# Patient Record
Sex: FEMALE | Race: BLACK OR AFRICAN AMERICAN | NOT HISPANIC OR LATINO | Employment: FULL TIME | URBAN - METROPOLITAN AREA
[De-identification: names, ages, dates, MRNs, and addresses within clinical notes are randomized per-mention and may not be internally consistent; named-entity substitution may affect disease eponyms.]

---

## 2024-05-07 DIAGNOSIS — M79.10 MYALGIA: Primary | ICD-10-CM

## 2024-05-15 ENCOUNTER — CLINICAL SUPPORT (OUTPATIENT)
Dept: REHABILITATION | Facility: HOSPITAL | Age: 54
End: 2024-05-15
Payer: OTHER GOVERNMENT

## 2024-05-15 DIAGNOSIS — M54.59 OTHER LOW BACK PAIN: Primary | ICD-10-CM

## 2024-05-15 PROCEDURE — 97813 ACUP 1/> W/ESTIM 1ST 15 MIN: CPT | Mod: PN

## 2024-05-15 PROCEDURE — 97814 ACUP 1/> W/ESTIM EA ADDL 15: CPT | Mod: PN

## 2024-05-15 NOTE — PROGRESS NOTES
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  Medical Diagnosis: Chronic lower back pain with sciatica    Evaluation Date: 5/15/2024    Visit #/Visits authorized: [unfilled] 1/12    Precautions: Standard and Pre Diabetes    Subjective     Chief Concern: Lower back pain for 25 years    Cancer Related Symptoms: None    Medical necessity is demonstrated by the following IMPAIRMENTS: Medical Necessity: Decreased mobility limits day to day activities, social, and emergent situations and Decreased quality of life                Aggravating Factors:  standing and prolonged sitting   Relieving Factors:  Medication    Symptom Description:     Quality:  Throbbing  Severity:  9  Frequency:  continuously    Previous Treatments Tried:  Medication    HEENT:  WNL    Chest:  WNL    Digestion:     Diet: in general, a "healthy" diet     Fluids: coffee 1 /day, is drinking plenty of fluids, none  Taste/Appetite: good   Symptoms: none    Sleep: not good    Energy Levels:  6/10    Psychological Symptoms:  None    Other Symptoms: None    GYN Symptoms: None    Objective     Observation: Looks healthy    Tongue:      Body:  swollen edges   Color:  pink   Coating:  thin,  and white,    Pulse:        wiry       New Findings:  None    Treatment     Treatment Principles:  Move Qi and Blood, stop pain    Acupuncture points used:    Bilateral points:James Steveno Karma Ji +5, Bella on lower back + 5, UB 54, GB 30, Bella on gluteal muscle + 4  Unilateral points:  Left:  Right:  EA: Right: UB 54, GB 30, Bella on gluteal muscle + 2  EA: Left: UB 54, GB 30, Bella on gluteal muscle + 2  Auricular Treatment:  None  Needles In: 32  Needles Out: 32  Needles W/ STIM placed: 3:50 PM  Needles W/ STIM removed: 4:50 PM      Other Traditional Chinese Medicine Modalities - None    Assessment     After treatment, patient felt Patient felt good "     Patient prognosis is Good.     Patient will continue to benefit from acupuncture treatment to address the deficits listed in the problem list box on initial evaluation, provide patient family education and to maximize pt's level of independence in the home and community environment.     Patient's spiritual, cultural and educational needs considered and pt agreeable to plan of care and goals.     Anticipated barriers to treatment: None    Plan     Recommend 1 /week for 12 sessions then re-assess.      Education:  Patient is aware of cumulative benefit of acupuncture

## 2024-05-22 ENCOUNTER — CLINICAL SUPPORT (OUTPATIENT)
Dept: REHABILITATION | Facility: HOSPITAL | Age: 54
End: 2024-05-22
Payer: OTHER GOVERNMENT

## 2024-05-22 DIAGNOSIS — M54.59 OTHER LOW BACK PAIN: Primary | ICD-10-CM

## 2024-05-22 PROCEDURE — 97814 ACUP 1/> W/ESTIM EA ADDL 15: CPT | Mod: PN

## 2024-05-22 PROCEDURE — 97813 ACUP 1/> W/ESTIM 1ST 15 MIN: CPT | Mod: PN

## 2024-05-22 NOTE — PROGRESS NOTES
Acupuncture Treatment Note     Name: Lyudmila Ch [facility] Number: [id number]    Traditional Chinese Medicine Diagnosis: Qi Stagnation and Blood Stasis   Physician: Order, Paper    Date of Service: 5/22/2024     Medical Diagnosis: Chronic lower back pain with sciatica   Visit #/Visits authorized: 2/ 12     Precautions: Standard and Pre Diabetes     Subjective     Chief Complaint:  Lower back pain for 25 years     Cancer Related Symptoms: None    Medical necessity is demonstrated by the following IMPAIRMENTS: Medical Necessity: Decreased mobility limits day to day activities, social, and emergent situations and Decreased quality of life    Response to Previous Treatment:  good    Quality of Symptoms (Better/Worse):  better    Other Condition/Symptoms:  None    Objective      New Findings:  None    Treatment Principles:   Move Qi and Blood, stop pain     Acupuncture points used:    Bilateral points:James Tuo Karma Ji +3, Bella on lower back + 7, UB 54, GB 30, Bella on gluteal muscle + 6  Unilateral points:  Left:  Right:  Auricular Treatment:  None  Needles In: 36  Needles Out: 36  EA: Right: UB 54, GB 30, Bella on gluteal muscle + 2  EA: Left: UB 54, GB 30, Bella on gluteal muscle + 2  Needles W/ STIM placed: 3:45 PM  Needles W/ STIM removed: 4:20PM    Other Traditional Chinese Medicine Modalities:  None    Recommendations:  PT    Education:  Patient is aware of cumulative effect of acupuncture      Assessment      Analysis of Treatment:  Patient felt good    Pt prognosis is Good.     Patient will continue to benefit from acupuncture treatment to address the deficits listed in the problem list box on initial evaluation, provide patient family education and to maximize pt's level of independence in the home and community environment.     Patient's spiritual, cultural and educational needs considered and pt agreeable to plan of care and goals.     Anticipated barriers to treatment: None    Plan     Recommend     1    /week for 12  treatments and re-assess.

## 2024-06-04 ENCOUNTER — CLINICAL SUPPORT (OUTPATIENT)
Dept: REHABILITATION | Facility: HOSPITAL | Age: 54
End: 2024-06-04
Payer: OTHER GOVERNMENT

## 2024-06-04 DIAGNOSIS — M54.59 OTHER LOW BACK PAIN: Primary | ICD-10-CM

## 2024-06-04 PROCEDURE — 97810 ACUP 1/> WO ESTIM 1ST 15 MIN: CPT | Mod: PN

## 2024-06-04 PROCEDURE — 97811 ACUP 1/> W/O ESTIM EA ADD 15: CPT | Mod: PN

## 2024-06-04 NOTE — PROGRESS NOTES
Acupuncture Treatment Note     Name: Lyudmila Ch [facility] Number: [id number]    Traditional Chinese Medicine Diagnosis: Qi Stagnation and Blood Stasis   Physician: Order, Paper    Date of Service: 6/4/2024     Medical Diagnosis: Chronic lower back pain with sciatica   Visit #/Visits authorized: 3/ 12     Precautions: Standard and Pre Diabetes     Subjective     Chief Complaint:  Lower back pain for 25 years     Cancer Related Symptoms: None    Medical necessity is demonstrated by the following IMPAIRMENTS: Medical Necessity: Decreased mobility limits day to day activities, social, and emergent situations and Decreased quality of life    Response to Previous Treatment:  good    Quality of Symptoms (Better/Worse):  better    Other Condition/Symptoms:  None    Objective      New Findings:  None    Treatment Principles:   Move Qi and Blood, stop pain     Acupuncture points used:    Bilateral points:UB 54, GB 30, Bella on gluteal muscle + 3  Unilateral points:  Left:  Right:  Auricular Treatment:  None  Needles In: 10  Needles Out: 10  Needles W/O STIM placed: 8:15 AM  Needles W/O STIM removed: 8:45 AM    Other Traditional Chinese Medicine Modalities:  None    Recommendations:  PT    Education:  Patient is aware of cumulative effect of acupuncture      Assessment      Analysis of Treatment:  Patient felt good    Pt prognosis is Good.     Patient will continue to benefit from acupuncture treatment to address the deficits listed in the problem list box on initial evaluation, provide patient family education and to maximize pt's level of independence in the home and community environment.     Patient's spiritual, cultural and educational needs considered and pt agreeable to plan of care and goals.     Anticipated barriers to treatment: None    Plan     Recommend     1   /week for 12  treatments and re-assess.

## 2024-06-11 ENCOUNTER — CLINICAL SUPPORT (OUTPATIENT)
Dept: REHABILITATION | Facility: HOSPITAL | Age: 54
End: 2024-06-11
Payer: OTHER GOVERNMENT

## 2024-06-11 DIAGNOSIS — M54.59 OTHER LOW BACK PAIN: Primary | ICD-10-CM

## 2024-06-11 PROCEDURE — 97814 ACUP 1/> W/ESTIM EA ADDL 15: CPT | Mod: PN

## 2024-06-11 PROCEDURE — 97813 ACUP 1/> W/ESTIM 1ST 15 MIN: CPT | Mod: PN

## 2024-06-11 NOTE — PROGRESS NOTES
Acupuncture Treatment Note     Name: Lyudmila Ch [facility] Number: [id number]    Traditional Chinese Medicine Diagnosis: Qi Stagnation and Blood Stasis   Physician: Order, Paper    Date of Service: 6/11/2024     Medical Diagnosis: Chronic lower back pain with sciatica   Visit #/Visits authorized: 4 / 12     Precautions: Standard and Pre Diabetes     Subjective     Chief Complaint:  Lower back pain for 25 years     Cancer Related Symptoms: None    Medical necessity is demonstrated by the following IMPAIRMENTS: Medical Necessity: Decreased mobility limits day to day activities, social, and emergent situations and Decreased quality of life    Response to Previous Treatment:  good    Quality of Symptoms (Better/Worse):  better    Other Condition/Symptoms:  None    Objective      New Findings:  None    Treatment Principles:   Move Qi and Blood, stop pain     Acupuncture points used:    Bilateral points:UB 54, GB 30, Bella on lower back + 5, Bella on gluteal muscle + 3  Unilateral points:  Left:  Right:  EA: Right: UB 54, GB 30, Bella on gluteal muscle + 2  EA: Left: UB 54, GB 30, Bella on gluteal muscle + 2  Auricular Treatment:  None  Needles In: 20  Needles Out: 20  [place] W/ STIM placed: 8:20 AM  [place] W/ STIM removed: 9:05 AM    Other Traditional Chinese Medicine Modalities:  None    Recommendations:  PT    Education:  Patient is aware of cumulative effect of acupuncture      Assessment      Analysis of Treatment:  Patient felt good    Pt prognosis is Good.     Patient will continue to benefit from acupuncture treatment to address the deficits listed in the problem list box on initial evaluation, provide patient family education and to maximize pt's level of independence in the home and community environment.     Patient's spiritual, cultural and educational needs considered and pt agreeable to plan of care and goals.     Anticipated barriers to treatment: None    Plan     Recommend     1   /week for 12   treatments and re-assess.

## 2024-06-18 ENCOUNTER — CLINICAL SUPPORT (OUTPATIENT)
Dept: REHABILITATION | Facility: HOSPITAL | Age: 54
End: 2024-06-18
Payer: OTHER GOVERNMENT

## 2024-06-18 DIAGNOSIS — M54.59 OTHER LOW BACK PAIN: Primary | ICD-10-CM

## 2024-06-18 PROCEDURE — 97814 ACUP 1/> W/ESTIM EA ADDL 15: CPT | Mod: PN

## 2024-06-18 PROCEDURE — 97813 ACUP 1/> W/ESTIM 1ST 15 MIN: CPT | Mod: PN

## 2024-06-18 NOTE — PROGRESS NOTES
Acupuncture Treatment Note     Name: Lyudmila Ch [facility] Number: [id number]    Traditional Chinese Medicine Diagnosis: Qi Stagnation and Blood Stasis   Physician: Order, Paper    Date of Service: 6/18/2024     Medical Diagnosis: Chronic lower back pain with sciatica   Visit #/Visits authorized: 5 / 12     Precautions: Standard and Pre Diabetes     Subjective     Chief Complaint:  Lower back pain for 25 years     Cancer Related Symptoms: None    Medical necessity is demonstrated by the following IMPAIRMENTS: Medical Necessity: Decreased mobility limits day to day activities, social, and emergent situations and Decreased quality of life    Response to Previous Treatment:  good    Quality of Symptoms (Better/Worse):  better    Other Condition/Symptoms:  None    Objective      New Findings:  None    Treatment Principles:   Move Qi and Blood, stop pain     Acupuncture points used:    Bilateral points:UB 54, GB 30, James Tuo Karma Ji + 4, Bella on lower back + 5, Bella on gluteal muscle + 6  Unilateral points:  Left:  Right:  EA: Right: UB 54, GB 30, Bella on gluteal muscle + 2  EA: Left: UB 54, GB 30, Bella on gluteal muscle + 2  Auricular Treatment:  None  Needles In: 34  Needles Out: 34  [place] W/ STIM placed: 8:10 AM  [place] W/ STIM removed: 8:50 AM    Other Traditional Chinese Medicine Modalities:  None    Recommendations:  PT    Education:  Patient is aware of cumulative effect of acupuncture      Assessment      Analysis of Treatment:  Patient felt good    Pt prognosis is Good.     Patient will continue to benefit from acupuncture treatment to address the deficits listed in the problem list box on initial evaluation, provide patient family education and to maximize pt's level of independence in the home and community environment.     Patient's spiritual, cultural and educational needs considered and pt agreeable to plan of care and goals.     Anticipated barriers to treatment: None    Plan     Recommend      1   /week for 12  treatments and re-assess.

## 2024-06-27 ENCOUNTER — CLINICAL SUPPORT (OUTPATIENT)
Dept: REHABILITATION | Facility: HOSPITAL | Age: 54
End: 2024-06-27
Payer: OTHER GOVERNMENT

## 2024-06-27 DIAGNOSIS — M54.59 OTHER LOW BACK PAIN: Primary | ICD-10-CM

## 2024-06-27 PROCEDURE — 97811 ACUP 1/> W/O ESTIM EA ADD 15: CPT | Mod: PN

## 2024-06-27 PROCEDURE — 97810 ACUP 1/> WO ESTIM 1ST 15 MIN: CPT | Mod: PN

## 2024-06-27 NOTE — PROGRESS NOTES
Acupuncture Treatment Note     Name: Lyudmila Ch [facility] Number: [id number]    Traditional Chinese Medicine Diagnosis: Qi Stagnation and Blood Stasis   Physician: Order, Paper    Date of Service: 6/27/2024     Medical Diagnosis: Chronic lower back pain with sciatica   Visit #/Visits authorized: 6 / 12     Precautions: Standard and Pre Diabetes     Subjective     Chief Complaint:  Lower back pain for 25 years     Cancer Related Symptoms: None    Medical necessity is demonstrated by the following IMPAIRMENTS: Medical Necessity: Decreased mobility limits day to day activities, social, and emergent situations and Decreased quality of life    Response to Previous Treatment:  good    Quality of Symptoms (Better/Worse):  better    Other Condition/Symptoms:  None    Objective      New Findings:  None    Treatment Principles:   Move Qi and Blood, stop pain     Acupuncture points used:    Bilateral points:UB 54, GB 30,  Bella on lower back + 5, Bella on gluteal muscle + 4  Unilateral points:  Left:  Right:  EA: Right: UB 54, GB 30, Bella on gluteal muscle + 2  EA: Left: UB 54, GB 30, Bella on gluteal muscle + 2  Auricular Treatment:  None  Needles In: 22  Needles Out: 22  [place] W/ STIM placed: 8:15 AM  Needles W/ STIM removed: 8:55 AM    Other Traditional Chinese Medicine Modalities:  None    Recommendations:  PT    Education:  Patient is aware of cumulative effect of acupuncture      Assessment      Analysis of Treatment:  Patient felt good    Pt prognosis is Good.     Patient will continue to benefit from acupuncture treatment to address the deficits listed in the problem list box on initial evaluation, provide patient family education and to maximize pt's level of independence in the home and community environment.     Patient's spiritual, cultural and educational needs considered and pt agreeable to plan of care and goals.     Anticipated barriers to treatment: None    Plan     Recommend     1   /week for 12   treatments and re-assess.

## 2024-07-02 ENCOUNTER — CLINICAL SUPPORT (OUTPATIENT)
Dept: REHABILITATION | Facility: HOSPITAL | Age: 54
End: 2024-07-02
Payer: OTHER GOVERNMENT

## 2024-07-02 DIAGNOSIS — M54.59 OTHER LOW BACK PAIN: Primary | ICD-10-CM

## 2024-07-02 PROCEDURE — 97814 ACUP 1/> W/ESTIM EA ADDL 15: CPT | Mod: PN

## 2024-07-02 PROCEDURE — 97813 ACUP 1/> W/ESTIM 1ST 15 MIN: CPT | Mod: PN

## 2024-07-02 NOTE — PROGRESS NOTES
Acupuncture Treatment Note     Name: Lyudmila Ch [facility] Number: [id number]    Traditional Chinese Medicine Diagnosis: Qi Stagnation and Blood Stasis   Physician: Order, Paper    Date of Service: 7/2/2024     Medical Diagnosis: Chronic lower back pain with sciatica   Visit #/Visits authorized: 7 / 8     Precautions: Standard and Pre Diabetes     Subjective     Chief Complaint:  Lower back pain for 25 years     Cancer Related Symptoms: None    Medical necessity is demonstrated by the following IMPAIRMENTS: Medical Necessity: Decreased mobility limits day to day activities, social, and emergent situations and Decreased quality of life    Response to Previous Treatment:  good    Quality of Symptoms (Better/Worse):  better    Other Condition/Symptoms:  None    Objective      New Findings:  None    Treatment Principles:   Move Qi and Blood, stop pain     Acupuncture points used:    Bilateral points:UB 54, GB 30,  Bella on lower back + 5, Bella on gluteal muscle + 4  Unilateral points:  Left:  Right:  EA: Right: UB 54, GB 30, Bella on gluteal muscle + 2  EA: Left: UB 54, GB 30, Bella on gluteal muscle + 2  Auricular Treatment:  None  Needles In: 22  Needles Out: 22  [place] W/ STIM placed: 8:15 AM  Needles W/ STIM removed: 8:55 AM    Other Traditional Chinese Medicine Modalities:  None    Recommendations:  PT    Education:  Patient is aware of cumulative effect of acupuncture      Assessment      Analysis of Treatment:  Patient felt good    Pt prognosis is Good.     Patient will continue to benefit from acupuncture treatment to address the deficits listed in the problem list box on initial evaluation, provide patient family education and to maximize pt's level of independence in the home and community environment.     Patient's spiritual, cultural and educational needs considered and pt agreeable to plan of care and goals.     Anticipated barriers to treatment: None    Plan     Recommend     1   /week for 12   treatments and re-assess.

## 2024-07-09 ENCOUNTER — CLINICAL SUPPORT (OUTPATIENT)
Dept: REHABILITATION | Facility: HOSPITAL | Age: 54
End: 2024-07-09
Payer: OTHER GOVERNMENT

## 2024-07-09 DIAGNOSIS — M54.59 OTHER LOW BACK PAIN: Primary | ICD-10-CM

## 2024-07-09 PROCEDURE — 97814 ACUP 1/> W/ESTIM EA ADDL 15: CPT | Mod: PN

## 2024-07-09 PROCEDURE — 97813 ACUP 1/> W/ESTIM 1ST 15 MIN: CPT | Mod: PN

## 2024-07-09 NOTE — PROGRESS NOTES
Acupuncture Treatment Note     Name: Lyudmila Ch [facility] Number: [id number]    Traditional Chinese Medicine Diagnosis: Qi Stagnation and Blood Stasis   Physician: Order, Paper    Date of Service: 7/9/2024     Medical Diagnosis: Chronic lower back pain with sciatica   Visit #/Visits authorized: 8 / 8     Precautions: Standard and Pre Diabetes     Subjective     Chief Complaint:  Lower back pain for 25 years     Cancer Related Symptoms: None    Medical necessity is demonstrated by the following IMPAIRMENTS: Medical Necessity: Decreased mobility limits day to day activities, social, and emergent situations and Decreased quality of life    Response to Previous Treatment:  good    Quality of Symptoms (Better/Worse):  better    Other Condition/Symptoms:  None    Objective      New Findings:  None    Treatment Principles:   Move Qi and Blood, stop pain     Acupuncture points used:    Bilateral points:UB 54, GB 30, James Tuo Karma Ji + 4, Bella on lower back + 5, Bella on gluteal muscle + 4  Unilateral points:  Left:  Right:  EA: Right: UB 54, GB 30, Bella on gluteal muscle + 2  EA: Left: UB 54, GB 30, Bella on gluteal muscle + 2  Auricular Treatment:  None  Needles In: 30  Needles Out: 30  [place] W/ STIM placed: 8:10 AM  [place] W/ STIM removed: 8:50 AM    Other Traditional Chinese Medicine Modalities:  None    Recommendations:  PT    Education:  Patient is aware of cumulative effect of acupuncture      Assessment      Analysis of Treatment:  Patient felt good    Pt prognosis is Good.     Patient will continue to benefit from acupuncture treatment to address the deficits listed in the problem list box on initial evaluation, provide patient family education and to maximize pt's level of independence in the home and community environment.     Patient's spiritual, cultural and educational needs considered and pt agreeable to plan of care and goals.     Anticipated barriers to treatment: None    Plan     Recommend     1    /week for 12  treatments and re-assess.

## 2025-07-28 DIAGNOSIS — M54.59 OTHER LOW BACK PAIN: Primary | ICD-10-CM

## 2025-08-05 ENCOUNTER — CLINICAL SUPPORT (OUTPATIENT)
Dept: REHABILITATION | Facility: HOSPITAL | Age: 55
End: 2025-08-05
Attending: FAMILY MEDICINE
Payer: OTHER GOVERNMENT

## 2025-08-05 DIAGNOSIS — M54.59 OTHER LOW BACK PAIN: Primary | ICD-10-CM

## 2025-08-05 PROCEDURE — 97814 ACUP 1/> W/ESTIM EA ADDL 15: CPT | Mod: PN

## 2025-08-05 PROCEDURE — 97813 ACUP 1/> W/ESTIM 1ST 15 MIN: CPT | Mod: PN

## 2025-08-05 NOTE — PROGRESS NOTES
Acupuncture Treatment Note     Name: Lyudmila Ch [place]  Clinic Number: [id number]    Traditional Chinese Medicine Diagnosis: Qi Stagnation and Blood Stasis   Physician: Sajan Hong MD    Date of Service: 8/5/2025     Medical Diagnosis: M54.59 (ICD-10-CM) - Other low back pain   Visit #/Visits authorized: 1 / 12    Precautions: Standard and Pre Diabetes     Subjective     Chief Complaint:  Lower back pain for 25 years     Cancer Related Symptoms: None    Medical necessity is demonstrated by the following IMPAIRMENTS: Medical Necessity: Decreased mobility limits day to day activities, social, and emergent situations and Decreased quality of life    Response to Previous Treatment:  good    Quality of Symptoms (Better/Worse):  better    Other Condition/Symptoms:  None    Objective      New Findings:  None    Treatment Principles:   Move Qi and Blood, stop pain     Acupuncture points used:    Bilateral points:UB 54, GB 30,  Bella on lower back + 6,  Bella on gluteal muscle + 4  Unilateral points:  Left:  Right:  EA: Right: UB 54, GB 30, Bella on gluteal muscle + 2  EA: Right: Bella on lower back + 4  EA: Left: UB 54, GB 30, Bella on gluteal muscle + 2  EA: Left: Bella on lower back + 4  Auricular Treatment:  None  Needles In: 24  Needles Out: 24  [place] W/ STIM placed: 7:10 AM  [place] W/ STIM removed: 8:10 AM    Other Traditional Chinese Medicine Modalities:  None    Recommendations:  PT    Education:  Patient is aware of cumulative effect of acupuncture      Assessment      Analysis of Treatment:  Patient felt good    Pt prognosis is Good.     Patient will continue to benefit from acupuncture treatment to address the deficits listed in the problem list box on initial evaluation, provide patient family education and to maximize pt's level of independence in the home and community environment.     Patient's spiritual, cultural and educational needs considered and pt agreeable to plan of care and goals.      Anticipated barriers to treatment: None    Plan     Recommend     1   /week for 12  treatments and re-assess.

## 2025-08-07 ENCOUNTER — CLINICAL SUPPORT (OUTPATIENT)
Dept: REHABILITATION | Facility: HOSPITAL | Age: 55
End: 2025-08-07
Payer: OTHER GOVERNMENT

## 2025-08-07 DIAGNOSIS — M54.59 OTHER LOW BACK PAIN: Primary | ICD-10-CM

## 2025-08-07 DIAGNOSIS — M79.10 MYALGIA: ICD-10-CM

## 2025-08-07 PROCEDURE — 97813 ACUP 1/> W/ESTIM 1ST 15 MIN: CPT | Mod: PN

## 2025-08-07 PROCEDURE — 97814 ACUP 1/> W/ESTIM EA ADDL 15: CPT | Mod: PN

## 2025-08-07 NOTE — PROGRESS NOTES
Acupuncture Treatment Note     Name: Lyudmila Ch [place]  Clinic Number: [id number]    Traditional Chinese Medicine Diagnosis: Qi Stagnation and Blood Stasis   Physician: Sajan Hong MD    Date of Service: 8/7/2025     Medical Diagnosis: M54.59 (ICD-10-CM) - Other low back pain   Visit #/Visits authorized: 2 / 12     Precautions: Standard and Pre Diabetes     Subjective     Chief Complaint:  Lower back pain for 25 years     Cancer Related Symptoms: None    Medical necessity is demonstrated by the following IMPAIRMENTS: Medical Necessity: Decreased mobility limits day to day activities, social, and emergent situations and Decreased quality of life    Response to Previous Treatment:  Good    Quality of Symptoms (Better/Worse):  better    Other Condition/Symptoms:  None    Objective      New Findings:  None    Treatment Principles:  Move Qi and Blood, stop pain     Acupuncture points used:    Bilateral points:UB 54, GB 30,  Bella on lower back + 4,  Bella on gluteal muscle + 4   Unilateral points:  Left:  Right:  EA: Right: UB 54, GB 30, Bella on gluteal muscle + 2  EA: Right: Bella on lower back + 4  EA: Left: UB 54, GB 30, Bella on gluteal muscle + 2  EA: Left: Bella on lower back + 4  Auricular Treatment:  None   Needles In: 20  Needles Out: 20  Needles W/ STIM placed: 7: 15 AM  Needles W/ STIM removed: 8:00 AM    Other Traditional Chinese Medicine Modalities:  None    Recommendations:  PT    Education:  Patient is aware of cumulative effect of acupuncture      Assessment      Analysis of Treatment:  Patient felt good    Pt prognosis is Good.     Patient will continue to benefit from acupuncture treatment to address the deficits listed in the problem list box on initial evaluation, provide patient family education and to maximize pt's level of independence in the home and community environment.     Patient's spiritual, cultural and educational needs considered and pt agreeable to plan of care and goals.      Anticipated barriers to treatment: None    Plan     Recommend      1   /week for 12   treatments and re-assess.

## 2025-08-12 ENCOUNTER — CLINICAL SUPPORT (OUTPATIENT)
Dept: REHABILITATION | Facility: HOSPITAL | Age: 55
End: 2025-08-12
Payer: OTHER GOVERNMENT

## 2025-08-12 DIAGNOSIS — M79.10 MYALGIA: ICD-10-CM

## 2025-08-12 DIAGNOSIS — M54.59 OTHER LOW BACK PAIN: Primary | ICD-10-CM

## 2025-08-12 PROCEDURE — 97814 ACUP 1/> W/ESTIM EA ADDL 15: CPT | Mod: PN

## 2025-08-12 PROCEDURE — 97813 ACUP 1/> W/ESTIM 1ST 15 MIN: CPT | Mod: PN

## 2025-08-14 ENCOUNTER — CLINICAL SUPPORT (OUTPATIENT)
Dept: REHABILITATION | Facility: HOSPITAL | Age: 55
End: 2025-08-14
Payer: OTHER GOVERNMENT

## 2025-08-14 DIAGNOSIS — M54.59 OTHER LOW BACK PAIN: Primary | ICD-10-CM

## 2025-08-14 DIAGNOSIS — M79.10 MYALGIA: ICD-10-CM

## 2025-08-14 PROCEDURE — 97813 ACUP 1/> W/ESTIM 1ST 15 MIN: CPT | Mod: PN

## 2025-08-14 PROCEDURE — 97814 ACUP 1/> W/ESTIM EA ADDL 15: CPT | Mod: PN

## 2025-08-19 ENCOUNTER — CLINICAL SUPPORT (OUTPATIENT)
Dept: REHABILITATION | Facility: HOSPITAL | Age: 55
End: 2025-08-19
Payer: OTHER GOVERNMENT

## 2025-08-19 DIAGNOSIS — M54.59 OTHER LOW BACK PAIN: Primary | ICD-10-CM

## 2025-08-19 DIAGNOSIS — M79.10 MYALGIA: ICD-10-CM

## 2025-08-19 PROCEDURE — 97813 ACUP 1/> W/ESTIM 1ST 15 MIN: CPT | Mod: PN

## 2025-08-19 PROCEDURE — 97814 ACUP 1/> W/ESTIM EA ADDL 15: CPT | Mod: PN

## 2025-08-26 ENCOUNTER — CLINICAL SUPPORT (OUTPATIENT)
Dept: REHABILITATION | Facility: HOSPITAL | Age: 55
End: 2025-08-26
Payer: OTHER GOVERNMENT

## 2025-08-26 DIAGNOSIS — M79.10 MYALGIA: ICD-10-CM

## 2025-08-26 DIAGNOSIS — M54.59 OTHER LOW BACK PAIN: Primary | ICD-10-CM

## 2025-08-26 PROCEDURE — 97813 ACUP 1/> W/ESTIM 1ST 15 MIN: CPT | Mod: PN

## 2025-08-26 PROCEDURE — 97814 ACUP 1/> W/ESTIM EA ADDL 15: CPT | Mod: PN

## 2025-09-02 ENCOUNTER — CLINICAL SUPPORT (OUTPATIENT)
Dept: REHABILITATION | Facility: HOSPITAL | Age: 55
End: 2025-09-02
Payer: OTHER GOVERNMENT

## 2025-09-02 DIAGNOSIS — M54.59 OTHER LOW BACK PAIN: Primary | ICD-10-CM

## 2025-09-02 DIAGNOSIS — M79.10 MYALGIA: ICD-10-CM

## 2025-09-02 PROCEDURE — 97813 ACUP 1/> W/ESTIM 1ST 15 MIN: CPT | Mod: PN

## 2025-09-02 PROCEDURE — 97814 ACUP 1/> W/ESTIM EA ADDL 15: CPT | Mod: PN
